# Patient Record
Sex: MALE | Race: WHITE | NOT HISPANIC OR LATINO | URBAN - METROPOLITAN AREA
[De-identification: names, ages, dates, MRNs, and addresses within clinical notes are randomized per-mention and may not be internally consistent; named-entity substitution may affect disease eponyms.]

---

## 2023-02-18 ENCOUNTER — EMERGENCY (EMERGENCY)
Facility: HOSPITAL | Age: 38
LOS: 1 days | Discharge: HOME | End: 2023-02-18
Attending: EMERGENCY MEDICINE | Admitting: EMERGENCY MEDICINE
Payer: COMMERCIAL

## 2023-02-18 VITALS
OXYGEN SATURATION: 98 % | RESPIRATION RATE: 16 BRPM | SYSTOLIC BLOOD PRESSURE: 116 MMHG | HEART RATE: 90 BPM | WEIGHT: 229.94 LBS | DIASTOLIC BLOOD PRESSURE: 71 MMHG | TEMPERATURE: 98 F

## 2023-02-18 DIAGNOSIS — Z98.890 OTHER SPECIFIED POSTPROCEDURAL STATES: Chronic | ICD-10-CM

## 2023-02-18 DIAGNOSIS — Z90.49 ACQUIRED ABSENCE OF OTHER SPECIFIED PARTS OF DIGESTIVE TRACT: Chronic | ICD-10-CM

## 2023-02-18 PROCEDURE — 99284 EMERGENCY DEPT VISIT MOD MDM: CPT

## 2023-02-18 PROCEDURE — 90471 IMMUNIZATION ADMIN: CPT

## 2023-02-18 PROCEDURE — 99283 EMERGENCY DEPT VISIT LOW MDM: CPT | Mod: 25

## 2023-02-18 PROCEDURE — 90715 TDAP VACCINE 7 YRS/> IM: CPT

## 2023-02-18 RX ORDER — TETANUS TOXOID, REDUCED DIPHTHERIA TOXOID AND ACELLULAR PERTUSSIS VACCINE, ADSORBED 5; 2.5; 8; 8; 2.5 [IU]/.5ML; [IU]/.5ML; UG/.5ML; UG/.5ML; UG/.5ML
0.5 SUSPENSION INTRAMUSCULAR ONCE
Refills: 0 | Status: COMPLETED | OUTPATIENT
Start: 2023-02-18 | End: 2023-02-18

## 2023-02-18 RX ADMIN — Medication 300 MILLIGRAM(S): at 17:52

## 2023-02-18 RX ADMIN — TETANUS TOXOID, REDUCED DIPHTHERIA TOXOID AND ACELLULAR PERTUSSIS VACCINE, ADSORBED 0.5 MILLILITER(S): 5; 2.5; 8; 8; 2.5 SUSPENSION INTRAMUSCULAR at 17:37

## 2023-02-18 NOTE — ED ADULT NURSE REASSESSMENT NOTE - NS ED NURSE REASSESS COMMENT FT1
1815:  patient was d/c.  instructions once again reiterated to him regarding signs / symptoms of infection and if any of this occurs, he is to contact plastics.  if fever develops, etc, he is to return to the er.  the patient verbalized understanding.  francine avery

## 2023-02-18 NOTE — ED PROVIDER NOTE - PATIENT PORTAL LINK FT
You can access the FollowMyHealth Patient Portal offered by [facility] by registering at the following website: [email or web address]. By joining [facility]’s FollowMyHealth portal, you will also be able to view your health information using other applications (apps) compatible with our system.

## 2023-02-18 NOTE — ED ADULT NURSE REASSESSMENT NOTE - NS ED NURSE REASSESS COMMENT FT1
1730:  received patient.  he is alert / orineted x4.  sustained laceration to abraham aspect left hand when he fell at work and outstretched his hand to catch his fall, which landed on a bar.  unsure as to when last tetanus was.  He states he did not sustain any other injury / trauma.  his co worker applied a tourniquet as the patient states the blood was squirting out.  this was removed by er MD.  long laceration in the thenar eminence of the left hand.  he is right hand dominant.  alert / oriented x4.  no respiratory distress.  Plastics at bedside, and under sterile technique, sutures were applied to close the wound, the patient tolerated well.  francine avery.

## 2023-02-18 NOTE — ED PROVIDER NOTE - CARE PROVIDER_API CALL
Renan Hunter)  Plastic Surgery  [address] [place] [address], [address] [place] [id number]  Phone: [phone]  Fax: [phone]  Follow Up Time:

## 2023-02-18 NOTE — ED PROVIDER NOTE - OBJECTIVE STATEMENT
37-year-old male with no significant past medical history, states while at work he had slipped and his left hand to grab a metal pole and that it cut open his left palm, causing bleeding and patient came to the ER.

## 2023-02-18 NOTE — ED PROVIDER NOTE - NSFOLLOWUPINSTRUCTIONS_ED_ALL_ED_FT
A laceration is a cut that may go through all layers of the skin and into the tissue that is right under the skin. Some lacerations heal on their own. Others need to be closed with stitches (sutures), staples, skin adhesive strips, or skin glue.    Proper care of a laceration reduces the risk for infection, helps the laceration heal better, and may prevent scarring.      General tips    •Keep the wound clean and dry.      • Do not scratch or pick at the wound.      •Wash your hands with soap and water for at least 20 seconds before and after touching your wound or changing your bandage (dressing). If soap and water are not available, use hand .      • Do not usedisinfectants or antiseptics, such as rubbing alcohol, to clean your wound unless told by your health care provider.      •If you were given a dressing, you should change it at least once a day, or as told by your health care provider. You should also change it if it becomes wet or dirty.        How to care for your laceration    If sutures or staples were used:     •Keep the wound completely dry for the first 24 hours, or as told by your health care provider. After that time, you may shower or bathe. Do not soak your wound in water until after the sutures or staples have been removed.    •Clean the wound once each day, or as told by your health care provider. To do this:  •Wash the wound with soap and water.      •Rinse the wound with water to remove all soap.      •Pat the wound dry with a clean towel. Do not rub the wound.        •After cleaning the wound, apply a thin layer of antibiotic ointment, other topical ointments, or a non-adherent dressing as told by your health care provider. This will help prevent infection and keep the dressing from sticking to the wound.      •Have the sutures or staples removed as told by your health care provider. Do not  remove sutures or staples yourself.      If skin adhesive strips were used:     • Do not get the skin adhesive strips wet. You may shower or bathe, but keep the wound dry.      •If the wound gets wet, pat it dry with a clean towel. Do not rub the wound.      •Skin adhesive strips fall off on their own. If adhesive strip edges start to loosen and curl up, you may trim the loose edges. Do not remove adhesive strips completely unless your health care provider tells you to do that.      If skin glue was used:     •You may shower or bathe, but try to keep the wound dry. Do not soak the wound in water.      •After showering or bathing, pat the wound dry with a clean towel. Do not rub the wound.      • Do not do any activities that will make you sweat a lot until the skin glue has fallen off.      • Do not apply liquid, cream, or ointment medicine to the wound while the skin glue is in place. Doing this may loosen the film before the wound has healed.      •If a dressing is placed over the wound, do not apply tape directly over the skin glue. Doing this may cause the glue to be pulled off before the wound has healed.      • Do not pick at the glue. Skin glue usually remains in place for 5–10 days and then falls off the skin.        Follow these instructions at home:    Medicines     •Take over-the-counter and prescription medicines only as told by your health care provider.      •If you were prescribed an antibiotic medicine or ointment, take or apply it as told by your health care provider. Do not stop using it even if your condition improves.      Managing pain and swelling   •If directed, put ice on the injured area. To do this:  •Put ice in a plastic bag.      •Place a towel between your skin and the bag.      •Leave the ice on for 20 minutes, 2–3 times a day.      •Remove the ice if your skin turns bright red. This is very important. If you cannot feel pain, heat, or cold, you have a greater risk of damage to the area.        •Raise (elevate) the injured area above the level of your heart while you are sitting or lying down for the first 24–48 hours after the laceration is repaired.        General instructions   Two wounds closed with skin glue. One is normal. The other is red with pus and infected.   •Avoid any activity that could cause your wound to reopen.    •Check your wound every day for signs of infection. Watch for:  •More redness, swelling, or pain.      •Fluid or blood.      •Warmth.      •Pus or a bad smell.        •Keep all follow-up visits. This is important.        Contact a health care provider if:    •You received a tetanus shot and you have swelling, severe pain, redness, or bleeding at the injection site.      •Your closed wound breaks open.    •You have any of these signs of infection:  •More redness, swelling, or pain around your wound.      •Fluid or blood coming from your wound.      •Warmth coming from your wound.      •Pus or a bad smell coming from your wound.      •A fever.        •You notice something coming out of the wound, such as wood or glass.      •Your pain is not controlled with medicine.      •You notice a change in the color of your skin near your wound.      •You need to change the dressing often.      •You develop a new rash.      •You have numbness around the wound.        Get help right away if:    •You develop severe swelling around the wound.      •Your pain suddenly increases and is severe.      •You develop painful lumps near the wound or on skin anywhere else on your body.      •You have a red streak going away from your wound.      •The wound is on your hand or foot, and you cannot properly move a finger or toe.      •The wound is on your hand or foot, and you notice that your fingers or toes look pale or bluish.        Summary    •A laceration is a cut that may go through all layers of the skin and into the tissue that is right under the skin.      •Some lacerations heal on their own. Others need to be closed with stitches (sutures), staples, skin adhesive strips, or skin glue.      •Proper care of a laceration reduces the risk of infection, helps the laceration heal better, and may prevent scarring.      This information is not intended to replace advice given to you by your health care provider. Make sure you discuss any questions you have with your health care provider.      Document Revised: 02/24/2022 Document Reviewed: 02/24/2022    Elsevier Patient Education © 2022 [employer]

## 2023-02-18 NOTE — ED ADULT NURSE REASSESSMENT NOTE - NS ED NURSE REASSESS COMMENT FT1
1744:  medicated as ordered.  the patient is d/c,  no iv placed.  he was given all of his d/c papers and instructed to f/u with plastics (name provided).  he is to see him next friday, and instructed to keep dressing on and clean / dry.  if anything problems arise, he is to call plastics.  the patient verbalized understanding of all instructions.   he ambulated out of the ed in stable condition, no distress and with all belongings.  francine avery.

## 2023-02-18 NOTE — ED PROVIDER NOTE - CLINICAL SUMMARY MEDICAL DECISION MAKING FREE TEXT BOX
37-year-old male with left palmar thenar eminence laceration, neurovascularly intact cap refill less than 2 seconds sensation intact able to flex and extend thumb able to oppose thumb and pinky together, to call plastics hand on-call for evaluation and suture repair.

## 2023-02-18 NOTE — ED ADULT NURSE NOTE - OBJECTIVE STATEMENT
the patient presents to the er with complaints of laceration to thenar eminence region of the left hand.  states he was at work and slipped and fell, landed on his hand which landed on a bar.  he does not recall his last tetanus injection.  sensation is intact to the thumb and fingers.  able to move fingers and give "thumbs' up sign".  he did not hit head in the fall.  he has no other injuries.  he is right hand dominant.  co worker applied a tourniquet as the patient states the blood was "squirting out".